# Patient Record
Sex: FEMALE | Race: WHITE | HISPANIC OR LATINO | Employment: UNEMPLOYED | URBAN - METROPOLITAN AREA
[De-identification: names, ages, dates, MRNs, and addresses within clinical notes are randomized per-mention and may not be internally consistent; named-entity substitution may affect disease eponyms.]

---
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
## 2022-01-26 ENCOUNTER — HOSPITAL ENCOUNTER (EMERGENCY)
Facility: HOSPITAL | Age: 22
Discharge: HOME | End: 2022-01-27
Attending: EMERGENCY MEDICINE

## 2022-01-26 VITALS
DIASTOLIC BLOOD PRESSURE: 85 MMHG | RESPIRATION RATE: 16 BRPM | SYSTOLIC BLOOD PRESSURE: 168 MMHG | HEART RATE: 74 BPM | TEMPERATURE: 97.5 F | OXYGEN SATURATION: 98 %

## 2022-01-26 DIAGNOSIS — F41.9 ANXIETY: Primary | ICD-10-CM

## 2022-01-26 PROCEDURE — 99282 EMERGENCY DEPT VISIT SF MDM: CPT | Performed by: EMERGENCY MEDICINE

## 2022-01-26 PROCEDURE — 99282 EMERGENCY DEPT VISIT SF MDM: CPT

## 2022-01-26 PROCEDURE — 96372 THER/PROPH/DIAG INJ SC/IM: CPT

## 2022-01-26 RX ORDER — OLANZAPINE 10 MG/1
5 INJECTION, POWDER, LYOPHILIZED, FOR SOLUTION INTRAMUSCULAR ONCE
Status: COMPLETED | OUTPATIENT
Start: 2022-01-26 | End: 2022-01-26

## 2022-01-26 RX ADMIN — OLANZAPINE 5 MG: 10 INJECTION, POWDER, FOR SOLUTION INTRAMUSCULAR at 22:58

## 2022-01-26 RX ADMIN — WATER 2.1 ML: 1 INJECTION INTRAMUSCULAR; INTRAVENOUS; SUBCUTANEOUS at 23:00

## 2022-01-27 RX ORDER — ALPRAZOLAM 0.25 MG/1
0.5 TABLET ORAL ONCE
Status: DISCONTINUED | OUTPATIENT
Start: 2022-01-27 | End: 2022-01-27 | Stop reason: HOSPADM

## 2022-01-28 NOTE — ED PROVIDER NOTES
History  Chief Complaint   Patient presents with    Panic Attack     Patient reports that she is feeling like she is having a panic attack  States she has been having periods SOB and uncontrollable crying  States she also has HA , rates pain 10/10  Denies headaches like this in the past     HPI     Patient is a pleasant 25 yof who presents with anxiety  + history of panic attacks  Absolutely no other complaints  No f/c/s/n/v/d  No cp or sob  No abdominal pain  No dysuria, hematuria  No pleuritic chest pain  MDM pleasant 25 yof, feeling completely better after treatment, will dc  Prior to Admission Medications   Prescriptions Last Dose Informant Patient Reported? Taking? Junel FE 1/20 1-20 MG-MCG per tablet   No No   Sig: TAKE 1 TABLET BY MOUTH EVERY DAY   cholecalciferol (VITAMIN D3) 400 units tablet   Yes No   Sig: Take 400 Units by mouth daily      Facility-Administered Medications: None       Past Medical History:   Diagnosis Date    Fatty liver     Fatty liver        Past Surgical History:   Procedure Laterality Date    GASTRIC RESTRICTION SURGERY      gastric sleeve       Family History   Problem Relation Age of Onset    Thyroid cancer Mother     Diabetes Mother     Ovarian cancer Paternal Aunt     Scoliosis Sister     Other Sister         born with one kidney    Club foot Sister     Heart murmur Sister     Colon cancer Maternal Grandmother     Heart disease Maternal Grandmother     Hypertension Maternal Grandmother     Diabetes Maternal Grandmother     Other Maternal Grandfather         liver failure     I have reviewed and agree with the history as documented      E-Cigarette/Vaping    E-Cigarette Use Current Every Day User      E-Cigarette/Vaping Substances    Nicotine Yes     THC Yes     CBD No     Flavoring Yes     Other No      Social History     Tobacco Use    Smoking status: Light Tobacco Smoker    Smokeless tobacco: Never Used   Vaping Use    Vaping Use: Every day    Substances: Nicotine, THC, Flavoring   Substance Use Topics    Alcohol use: Never    Drug use: Yes     Types: Marijuana       Review of Systems   Psychiatric/Behavioral: The patient is nervous/anxious  All other systems reviewed and are negative  Physical Exam  Physical Exam  Vitals and nursing note reviewed  Constitutional:       Appearance: She is well-developed  HENT:      Head: Normocephalic and atraumatic  Right Ear: External ear normal       Left Ear: External ear normal    Eyes:      Conjunctiva/sclera: Conjunctivae normal    Neck:      Vascular: No JVD  Trachea: No tracheal deviation  Cardiovascular:      Rate and Rhythm: Normal rate and regular rhythm  Heart sounds: Normal heart sounds  Pulmonary:      Effort: Pulmonary effort is normal  No respiratory distress  Breath sounds: No wheezing or rales  Abdominal:      Palpations: Abdomen is soft  Tenderness: There is no abdominal tenderness  There is no guarding or rebound  Musculoskeletal:         General: No tenderness  Cervical back: Normal range of motion and neck supple  Skin:     General: Skin is warm and dry  Findings: No erythema or rash  Neurological:      General: No focal deficit present  Mental Status: She is alert and oriented to person, place, and time  Motor: No weakness  Psychiatric:         Behavior: Behavior normal          Thought Content:  Thought content normal          Vital Signs  ED Triage Vitals [01/26/22 2240]   Temperature Pulse Respirations Blood Pressure SpO2   97 5 °F (36 4 °C) 74 16 168/85 98 %      Temp Source Heart Rate Source Patient Position - Orthostatic VS BP Location FiO2 (%)   Oral Monitor -- -- --      Pain Score       --           Vitals:    01/26/22 2240   BP: 168/85   Pulse: 74         Visual Acuity      ED Medications  Medications   OLANZapine (ZyPREXA) IM injection 5 mg (5 mg Intramuscular Given 1/26/22 4938)   sterile water injection **ADS Override Pull** (2 1 mL  Given 1/26/22 2300)       Diagnostic Studies  Results Reviewed     None                 No orders to display              Procedures  Procedures         ED Course                                             MDM    Disposition  Final diagnoses:   Anxiety     Time reflects when diagnosis was documented in both MDM as applicable and the Disposition within this note     Time User Action Codes Description Comment    1/26/2022 11:29 PM Cali Prince, [address] [F41 9] Anxiety       ED Disposition     ED Disposition Condition Date/Time Comment    Discharge Stable Wed Jan 26, 2022 11:29 PM Evert Amaya discharge to home/self care  Follow-up Information     Follow up With Specialties Details Why Contact Info    Lucas Norman DO Family Medicine In 1 day  [address] [place] [address],[address]  [address] [facility], [address]  [phone]            Discharge Medication List as of 1/26/2022 11:29 PM      CONTINUE these medications which have NOT CHANGED    Details   cholecalciferol (VITAMIN D3) 400 units tablet Take 400 Units by mouth daily, Historical Med      Junel FE 1/20 1-20 MG-MCG per tablet TAKE 1 TABLET BY MOUTH EVERY DAY, Normal             No discharge procedures on file      PDMP Review     None          ED Provider  Electronically Signed by           Davon Jones MD  01/28/22 5364

## 2022-03-27 ENCOUNTER — APPOINTMENT (EMERGENCY)
Dept: CT IMAGING | Facility: HOSPITAL | Age: 22
End: 2022-03-27

## 2022-03-27 ENCOUNTER — APPOINTMENT (EMERGENCY)
Dept: RADIOLOGY | Facility: HOSPITAL | Age: 22
End: 2022-03-27

## 2022-03-27 ENCOUNTER — HOSPITAL ENCOUNTER (EMERGENCY)
Facility: HOSPITAL | Age: 22
Discharge: HOME | End: 2022-03-27
Attending: EMERGENCY MEDICINE | Admitting: EMERGENCY MEDICINE

## 2022-03-27 VITALS
HEART RATE: 60 BPM | TEMPERATURE: 98 F | OXYGEN SATURATION: 98 % | DIASTOLIC BLOOD PRESSURE: 51 MMHG | RESPIRATION RATE: 16 BRPM | SYSTOLIC BLOOD PRESSURE: 110 MMHG

## 2022-03-27 DIAGNOSIS — F41.9 ANXIETY: Primary | ICD-10-CM

## 2022-03-27 LAB
ALBUMIN SERPL BCP-MCNC: 4 G/DL (ref 3.5–5)
ALP SERPL-CCNC: 77 U/L (ref 34–104)
ALT SERPL W P-5'-P-CCNC: 76 U/L (ref 7–52)
ANION GAP SERPL CALCULATED.3IONS-SCNC: 10 MMOL/L (ref 4–13)
AST SERPL W P-5'-P-CCNC: 32 U/L (ref 13–39)
ATRIAL RATE: 64 BPM
BASOPHILS # BLD AUTO: 0.1 THOUSANDS/ΜL (ref 0–0.1)
BASOPHILS NFR BLD AUTO: 1 % (ref 0–1)
BILIRUB SERPL-MCNC: 0.46 MG/DL (ref 0.2–1)
BUN SERPL-MCNC: 11 MG/DL (ref 5–25)
CALCIUM SERPL-MCNC: 9.4 MG/DL (ref 8.4–10.2)
CARDIAC TROPONIN I PNL SERPL HS: 4 NG/L
CHLORIDE SERPL-SCNC: 103 MMOL/L (ref 96–108)
CO2 SERPL-SCNC: 24 MMOL/L (ref 21–32)
CREAT SERPL-MCNC: 0.5 MG/DL (ref 0.6–1.3)
EOSINOPHIL # BLD AUTO: 0.25 THOUSAND/ΜL (ref 0–0.61)
EOSINOPHIL NFR BLD AUTO: 2 % (ref 0–6)
ERYTHROCYTE [DISTWIDTH] IN BLOOD BY AUTOMATED COUNT: 12.7 % (ref 11.6–15.1)
ETHANOL SERPL-MCNC: <10 MG/DL
GFR SERPL CREATININE-BSD FRML MDRD: 140 ML/MIN/1.73SQ M
GLUCOSE SERPL-MCNC: 106 MG/DL (ref 65–140)
HCG SERPL QL: NEGATIVE
HCT VFR BLD AUTO: 40.1 % (ref 34.8–46.1)
HGB BLD-MCNC: 13.3 G/DL (ref 11.5–15.4)
IMM GRANULOCYTES # BLD AUTO: 0.03 THOUSAND/UL (ref 0–0.2)
IMM GRANULOCYTES NFR BLD AUTO: 0 % (ref 0–2)
LYMPHOCYTES # BLD AUTO: 3.31 THOUSANDS/ΜL (ref 0.6–4.47)
LYMPHOCYTES NFR BLD AUTO: 29 % (ref 14–44)
MCH RBC QN AUTO: 28.5 PG (ref 26.8–34.3)
MCHC RBC AUTO-ENTMCNC: 33.2 G/DL (ref 31.4–37.4)
MCV RBC AUTO: 86 FL (ref 82–98)
MONOCYTES # BLD AUTO: 0.84 THOUSAND/ΜL (ref 0.17–1.22)
MONOCYTES NFR BLD AUTO: 7 % (ref 4–12)
NEUTROPHILS # BLD AUTO: 6.92 THOUSANDS/ΜL (ref 1.85–7.62)
NEUTS SEG NFR BLD AUTO: 61 % (ref 43–75)
NRBC BLD AUTO-RTO: 0 /100 WBCS
P AXIS: 10 DEGREES
PLATELET # BLD AUTO: 305 THOUSANDS/UL (ref 149–390)
PMV BLD AUTO: 9.5 FL (ref 8.9–12.7)
POTASSIUM SERPL-SCNC: 3.8 MMOL/L (ref 3.5–5.3)
PR INTERVAL: 162 MS
PROT SERPL-MCNC: 7.4 G/DL (ref 6.4–8.4)
QRS AXIS: 23 DEGREES
QRSD INTERVAL: 92 MS
QT INTERVAL: 420 MS
QTC INTERVAL: 437 MS
RBC # BLD AUTO: 4.66 MILLION/UL (ref 3.81–5.12)
SODIUM SERPL-SCNC: 137 MMOL/L (ref 135–147)
T WAVE AXIS: 27 DEGREES
VENTRICULAR RATE: 65 BPM
WBC # BLD AUTO: 11.45 THOUSAND/UL (ref 4.31–10.16)

## 2022-03-27 PROCEDURE — 84703 CHORIONIC GONADOTROPIN ASSAY: CPT | Performed by: STUDENT IN AN ORGANIZED HEALTH CARE EDUCATION/TRAINING PROGRAM

## 2022-03-27 PROCEDURE — 82077 ASSAY SPEC XCP UR&BREATH IA: CPT | Performed by: EMERGENCY MEDICINE

## 2022-03-27 PROCEDURE — 93005 ELECTROCARDIOGRAM TRACING: CPT

## 2022-03-27 PROCEDURE — 84484 ASSAY OF TROPONIN QUANT: CPT | Performed by: STUDENT IN AN ORGANIZED HEALTH CARE EDUCATION/TRAINING PROGRAM

## 2022-03-27 PROCEDURE — 85025 COMPLETE CBC W/AUTO DIFF WBC: CPT | Performed by: STUDENT IN AN ORGANIZED HEALTH CARE EDUCATION/TRAINING PROGRAM

## 2022-03-27 PROCEDURE — 99284 EMERGENCY DEPT VISIT MOD MDM: CPT

## 2022-03-27 PROCEDURE — 96372 THER/PROPH/DIAG INJ SC/IM: CPT

## 2022-03-27 PROCEDURE — 36415 COLL VENOUS BLD VENIPUNCTURE: CPT | Performed by: STUDENT IN AN ORGANIZED HEALTH CARE EDUCATION/TRAINING PROGRAM

## 2022-03-27 PROCEDURE — 93010 ELECTROCARDIOGRAM REPORT: CPT | Performed by: INTERNAL MEDICINE

## 2022-03-27 PROCEDURE — 99284 EMERGENCY DEPT VISIT MOD MDM: CPT | Performed by: STUDENT IN AN ORGANIZED HEALTH CARE EDUCATION/TRAINING PROGRAM

## 2022-03-27 PROCEDURE — 80053 COMPREHEN METABOLIC PANEL: CPT | Performed by: STUDENT IN AN ORGANIZED HEALTH CARE EDUCATION/TRAINING PROGRAM

## 2022-03-27 PROCEDURE — 71045 X-RAY EXAM CHEST 1 VIEW: CPT

## 2022-03-27 RX ORDER — OLANZAPINE 10 MG/1
5 INJECTION, POWDER, LYOPHILIZED, FOR SOLUTION INTRAMUSCULAR ONCE
Status: COMPLETED | OUTPATIENT
Start: 2022-03-27 | End: 2022-03-27

## 2022-03-27 RX ORDER — ACETAMINOPHEN 325 MG/1
650 TABLET ORAL ONCE
Status: COMPLETED | OUTPATIENT
Start: 2022-03-27 | End: 2022-03-27

## 2022-03-27 RX ADMIN — WATER 10 ML: 1 INJECTION INTRAMUSCULAR; INTRAVENOUS; SUBCUTANEOUS at 06:46

## 2022-03-27 RX ADMIN — ACETAMINOPHEN 650 MG: 325 TABLET ORAL at 07:33

## 2022-03-27 RX ADMIN — OLANZAPINE 5 MG: 10 INJECTION, POWDER, LYOPHILIZED, FOR SOLUTION INTRAMUSCULAR at 06:46

## 2022-03-27 NOTE — ED CARE HANDOFF
Emergency Department Sign Out Note        Sign out and transfer of care from dr Gerson Reyes    See Separate Emergency Department note  The patient, Marisol Hess, was evaluated by the previous provider for anxiety headache  prob undx pscy; Hx pseudo stroke   Gastric     Anxiety 30mix   Got zypreexaa before  Sudden onset ha;   Ct   Cp sb pravink last  Perc 0; no     Workup Completed:      ED Course / Workup Pending (followup):  anierty and etoh  Blood ekg;     ekg sr no st elevation;    Patient notes headache was earlier  worse than it is now; stabbing pain left eye , prior episode less painful, more stabbing to wards more the midline on the left; Had some etoh, will check; Agrees to wait for behavioral health eval;   here with her      etoh  Response headache  Behavioral health     Review of PCP note January notes set up for psych, mild headaches on going eval, anxiety;     PERC Rule for PE      Most Recent Value   PERC Rule for PE    Age >=50 --   HR >=100 0 Filed at: 03/27/2022 0647   O2 Sat on room air < 95% 0 Filed at: 03/27/2022 0647   History of PE or DVT 0 Filed at: 03/27/2022 6358   Recent trauma or surgery 0 Filed at: 03/27/2022 0647   Hemoptysis 0 Filed at: 03/27/2022 0083   Exogenous estrogen 0 Filed at: 03/27/2022 0647   Unilateral leg swelling 0 Filed at: 03/27/2022 8749   PERC Rule for PE Results 0 Filed at: 03/27/2022 7695                       Procedures  MDM    Pt declined ct, no insurance; Discussed headache with patient, not as severe;   At time of dc, after sleep and tylenol, headache is completely gone, feels much better;  concurs    Disposition  Final diagnoses:   Anxiety     Time reflects when diagnosis was documented in both MDM as applicable and the Disposition within this note     Time User Action Codes Description Comment    3/27/2022  8:28 AM Johny Mckeon Add [F41 9] Anxiety       ED Disposition     ED Disposition Condition Date/Time Comment    Discharge Stable Sun Mar 27, 2022 10:15 AM Ame Dumont discharge to home/self care  Follow-up Information    None       Discharge Medication List as of 3/27/2022 10:17 AM      CONTINUE these medications which have NOT CHANGED    Details   cholecalciferol (VITAMIN D3) 400 units tablet Take 400 Units by mouth daily, Historical Med      Junel FE 1/20 1-20 MG-MCG per tablet TAKE 1 TABLET BY MOUTH EVERY DAY, Normal           No discharge procedures on file         ED Provider  Electronically Signed by     Christine Hinton MD  03/27/22 860 [address], [place]  03/27/22 4712

## 2022-03-27 NOTE — Clinical Note
Dilan Mix was seen and treated in our emergency department on 3/27/2022  Diagnosis:     Ami    She may return on this date: Off work as needed for symptoms march 26,27 and 28 thru 30th as needed; If you have any questions or concerns, please don't hesitate to call        George Arnold MD    ______________________________           _______________          _______________  [facility] Representative                              Date                                Time

## 2022-03-27 NOTE — ED ATTENDING ATTESTATION
3/27/2022  IJozef MD, saw and evaluated the patient  I have discussed the patient with the resident/non-physician practitioner and agree with the resident's/non-physician practitioner's findings, Plan of Care, and MDM as documented in the resident's/non-physician practitioner's note, except where noted  All available labs and Radiology studies were reviewed  I was present for key portions of any procedure(s) performed by the resident/non-physician practitioner and I was immediately available to provide assistance  At this point I agree with the current assessment done in the Emergency Department    I have conducted an independent evaluation of this patient a history and physical is as follows:    ED Course         Critical Care Time  Procedures

## 2022-03-27 NOTE — Clinical Note
accompanied Tatianna Hartman to the emergency department on 3/27/2022  Return date if applicable:     Please excuse patient  here with her today and march 28 as needed; If you have any questions or concerns, please don't hesitate to call        Deanna Newman MD

## 2022-03-27 NOTE — ED NOTES
Pt reports "feeling better"  Wants to be discharged    Provider aware     Temi Dougherty, RN  03/27/22 9401

## 2022-03-27 NOTE — ED PROVIDER NOTES
History  Chief Complaint   Patient presents with    Panic Attack     Patient presents with anxiety that reports was not precipated by any specific event     PMHx: fatty liver, anxiety, migraines  PSH: gastric sleeve    Jasmyne Callaway is a 25year old female who presents to the ED with anxiety, substernal non-radiating chest pain, SOB, sudden onset severe HA, and blurry vision that began 30 minutes PTA, denies alleviating/aggravating factors, denies taking any medication PTA for symptom control  Patient notes h/o migraines, states HA is worse than usual migraines  Patient denies SI/HI/AH/VH and drug use, patient states was drinking alcohol 3-4 hours PTA  Per chart review, patient recently treated for anxiety with Zyprexa with significant improvement, patient states would like to try this today  Denies exogenous estrogen use (states is no longer taking Junel), h/o DVT/PE, recent surgery/immobilization, recent travel, LE edema/erythema/pain, hemoptysis, h/o CA, pleuritic nature of CP  Denies falls, head strike, neck pain, n/v  Denies any other associated sxs including lightheadedness, syncope, fever/chills, cough, sputum production, palpitations, arm pain, diaphoresis, orthopnea, abdominal pain, diarrhea, urinary symptoms, or any other concerns at this time  History provided by:  Patient and medical records   used: No        Prior to Admission Medications   Prescriptions Last Dose Informant Patient Reported? Taking?    Junel FE 1/20 1-20 MG-MCG per tablet   No No   Sig: TAKE 1 TABLET BY MOUTH EVERY DAY   cholecalciferol (VITAMIN D3) 400 units tablet   Yes No   Sig: Take 400 Units by mouth daily      Facility-Administered Medications: None       Past Medical History:   Diagnosis Date    Fatty liver     Fatty liver        Past Surgical History:   Procedure Laterality Date    GASTRIC RESTRICTION SURGERY      gastric sleeve       Family History   Problem Relation Age of Onset    Thyroid cancer Mother     Diabetes Mother     Ovarian cancer Paternal Aunt     Scoliosis Sister     Other Sister         born with one kidney    Club foot Sister     Heart murmur Sister     Colon cancer Maternal Grandmother     Heart disease Maternal Grandmother     Hypertension Maternal Grandmother     Diabetes Maternal Grandmother     Other Maternal Grandfather         liver failure     I have reviewed and agree with the history as documented  E-Cigarette/Vaping    E-Cigarette Use Current Every Day User      E-Cigarette/Vaping Substances    Nicotine Yes     THC Yes     CBD No     Flavoring Yes     Other No      Social History     Tobacco Use    Smoking status: Former Smoker    Smokeless tobacco: Never Used   Vaping Use    Vaping Use: Every day    Substances: Nicotine, THC, Flavoring   Substance Use Topics    Alcohol use: Yes     Comment: socially    Drug use: Yes     Types: Marijuana       Review of Systems   Constitutional: Negative for chills, diaphoresis and fever  HENT: Negative for ear pain, sore throat, trouble swallowing and voice change  Eyes: Positive for visual disturbance  Negative for pain and redness  Respiratory: Positive for shortness of breath  Negative for cough  Cardiovascular: Positive for chest pain  Negative for palpitations and leg swelling  Gastrointestinal: Negative for abdominal pain, diarrhea, nausea and vomiting  Genitourinary: Negative for dysuria and frequency  Musculoskeletal: Negative for arthralgias, back pain, gait problem and neck pain  Skin: Negative for color change and rash  Neurological: Positive for headaches  Negative for syncope, weakness, light-headedness and numbness  Psychiatric/Behavioral: Negative for hallucinations and suicidal ideas  The patient is nervous/anxious  Physical Exam  Physical Exam  Vitals and nursing note reviewed  Constitutional:       General: She is in acute distress (2/2 anxiety)        Appearance: Normal appearance  She is well-developed  She is obese  She is not ill-appearing  HENT:      Head: Normocephalic and atraumatic  Right Ear: External ear normal       Left Ear: External ear normal       Nose: Nose normal  No congestion or rhinorrhea  Mouth/Throat:      Mouth: Mucous membranes are moist    Eyes:      General:         Right eye: No discharge  Left eye: No discharge  Extraocular Movements: Extraocular movements intact  Conjunctiva/sclera: Conjunctivae normal       Pupils: Pupils are equal, round, and reactive to light  Cardiovascular:      Rate and Rhythm: Normal rate and regular rhythm  Heart sounds: No murmur heard  No friction rub  No gallop  Pulmonary:      Effort: Pulmonary effort is normal  No respiratory distress  Breath sounds: Normal breath sounds  No stridor  No wheezing, rhonchi or rales  Chest:      Chest wall: No tenderness  Abdominal:      General: Bowel sounds are normal  There is no distension  Palpations: Abdomen is soft  Tenderness: There is no abdominal tenderness  There is no right CVA tenderness, left CVA tenderness, guarding or rebound  Genitourinary:     Comments: Deferred  Musculoskeletal:         General: No swelling, deformity or signs of injury  Cervical back: Normal range of motion and neck supple  No tenderness  Right lower leg: No edema  Left lower leg: No edema  Comments: No LE edema, erythema, or TTP b/l   Skin:     General: Skin is warm and dry  Capillary Refill: Capillary refill takes less than 2 seconds  Findings: No bruising, erythema or rash  Neurological:      General: No focal deficit present  Mental Status: She is alert and oriented to person, place, and time  Cranial Nerves: No cranial nerve deficit (CN II-XII intact)  Sensory: No sensory deficit  Motor: No weakness (5/5 strength to b/l UE & LE)        Coordination: Coordination normal       Gait: Gait normal    Psychiatric:         Mood and Affect: Mood is anxious  Speech: Speech normal          Behavior: Behavior is not agitated or aggressive  Behavior is cooperative  Thought Content: Thought content normal  Thought content does not include homicidal or suicidal ideation  Vital Signs  ED Triage Vitals [03/27/22 0639]   Temperature Pulse Respirations Blood Pressure SpO2   98 °F (36 7 °C) 71 20 137/95 99 %      Temp Source Heart Rate Source Patient Position - Orthostatic VS BP Location FiO2 (%)   Oral Monitor Lying Left arm --      Pain Score       No Pain           Vitals:    03/27/22 0639   BP: 137/95   Pulse: 71   Patient Position - Orthostatic VS: Lying         Visual Acuity      ED Medications  Medications   OLANZapine (ZyPREXA) IM injection 5 mg (5 mg Intramuscular Given 3/27/22 0646)   sterile water injection **ADS Override Pull** (10 mL  Given 3/27/22 0646)       Diagnostic Studies  Results Reviewed     Procedure Component Value Units Date/Time    HS Troponin 0hr (reflex protocol) [[id number]] Collected: 03/27/22 0659    Lab Status: In process Specimen: Blood from Arm, Right Updated: 03/27/22 0704    Comprehensive metabolic panel [[id number]] Collected: 03/27/22 0659    Lab Status: In process Specimen: Blood from Arm, Right Updated: 03/27/22 0704    hCG, qualitative pregnancy [[id number]] Collected: 03/27/22 0659    Lab Status: In process Specimen: Blood from Arm, Right Updated: 03/27/22 0704    CBC and differential [[id number]] Collected: 03/27/22 0659    Lab Status:  In process Specimen: Blood from Arm, Right Updated: 03/27/22 0704                 XR chest 1 view portable    (Results Pending)   CT head without contrast    (Results Pending)              Procedures  Procedures         ED Course  ED Course as of 03/27/22 0705   Claudia Smith Mar 27, 2022   0658 Patient declines head CT 2/2 lack of insurance, patient informed of risks, verbalized understanding and acceptance, patient would like to think about obtained CT     0703 Patient states is no longer taking Junecherri AARON      Most Recent Value   SBIRT (13-21 yo)    In order to provide better care to our patients, we are screening all of our patients for alcohol and drug use  Would it be okay to ask you these screening questions? Yes Filed at: 03/27/2022 0647   GALEN Initial Screen: During the past 12 months, did you:    1  Drink any alcohol (more than a few sips)? No Filed at: 03/27/2022 0647   2  Smoke any marijuana or hashish Yes Filed at: 03/27/2022 0647   3  Use anything else to get high? ("anything else" includes illegal drugs, over the counter and prescription drugs, and things that you sniff or 'anderson')? No Filed at: 03/27/2022 0647                    PERC Rule for PE      Most Recent Value   PERC Rule for PE    Age >=50 --   HR >=100 0 Filed at: 03/27/2022 0647   O2 Sat on room air < 95% 0 Filed at: 03/27/2022 8189   History of PE or DVT 0 Filed at: 03/27/2022 7379   Recent trauma or surgery 0 Filed at: 03/27/2022 8750   Hemoptysis 0 Filed at: 03/27/2022 2354   Exogenous estrogen 0 Filed at: 03/27/2022 9219   Unilateral leg swelling 0 Filed at: 03/27/2022 6192   PERC Rule for PE Results 0 Filed at: 03/27/2022 7006                            MDM  Number of Diagnoses or Management Options  Anxiety  Diagnosis management comments: Patient is a 25year old female who presents to the ED with anxiety, substernal non-radiating chest pain, SOB, sudden onset severe HA, and blurry vision x 30 minutes, h/o migraines, states HA is worse than usual migraines, denies SI/HI/AH/VH and drug use, denies any other associated sxs  Patient appears anxious, exam wnl  Recently treated for anxiety with Zyprexa with significant improvement, patient states would like this today  PERC 0, PE unlikely  Head CT ordered; patient declines 2/2 insurance issues   Patient informed of risks as given HA would like to r/o ICH, verbalized understanding, would like time to consider  Patient signed out to Dr Jessica Peguero pending, labs, CXR, re-eval, and dispo  Amount and/or Complexity of Data Reviewed  Clinical lab tests: ordered  Tests in the radiology section of CPT®: ordered        Disposition  Final diagnoses:   None     ED Disposition     None      Follow-up Information    None         Patient's Medications   Discharge Prescriptions    No medications on file       No discharge procedures on file      PDMP Review     None          ED Provider  Electronically Signed by           Gael Cuellar PA-C  03/28/22 9559

## 2022-03-27 NOTE — DISCHARGE INSTRUCTIONS
Call your primary care doctor office, dr Huy Ponce, Monday and set up followup appointment; Followup with psychiatry and counseling appointments; Take the medications prescribed to you by your doctor;      If questions or problems prior to seeing doctor hannah, call the office number and speak to the on call doctor, even if it is evening or night or weekend or holiday, there is always someone available to talk with you by phone;    Calling behavioral health crisis number is also an option for you if needed;[phone]

## 2022-07-27 ENCOUNTER — HOSPITAL ENCOUNTER (EMERGENCY)
Facility: HOSPITAL | Age: 22
Discharge: HOME | End: 2022-07-28
Attending: EMERGENCY MEDICINE
Payer: COMMERCIAL

## 2022-07-27 ENCOUNTER — APPOINTMENT (EMERGENCY)
Dept: RADIOLOGY | Facility: HOSPITAL | Age: 22
End: 2022-07-27
Payer: COMMERCIAL

## 2022-07-27 VITALS
BODY MASS INDEX: 53.14 KG/M2 | DIASTOLIC BLOOD PRESSURE: 59 MMHG | OXYGEN SATURATION: 98 % | HEART RATE: 66 BPM | RESPIRATION RATE: 16 BRPM | WEIGHT: 293 LBS | SYSTOLIC BLOOD PRESSURE: 126 MMHG

## 2022-07-27 DIAGNOSIS — R55 SYNCOPE: ICD-10-CM
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
## 2022-07-27 DIAGNOSIS — M62.838 MUSCLE SPASM: ICD-10-CM

## 2022-07-27 LAB — HCG SERPL QL: NEGATIVE

## 2022-07-27 PROCEDURE — 99285 EMERGENCY DEPT VISIT HI MDM: CPT | Performed by: EMERGENCY MEDICINE

## 2022-07-27 PROCEDURE — 93005 ELECTROCARDIOGRAM TRACING: CPT

## 2022-07-27 PROCEDURE — G1004 CDSM NDSC: HCPCS

## 2022-07-27 PROCEDURE — 74176 CT ABD & PELVIS W/O CONTRAST: CPT

## 2022-07-27 PROCEDURE — 99284 EMERGENCY DEPT VISIT MOD MDM: CPT

## 2022-07-27 PROCEDURE — 71250 CT THORAX DX C-: CPT

## 2022-07-27 PROCEDURE — 36415 COLL VENOUS BLD VENIPUNCTURE: CPT

## 2022-07-27 PROCEDURE — 84703 CHORIONIC GONADOTROPIN ASSAY: CPT

## 2022-07-27 RX ORDER — ACETAMINOPHEN 325 MG/1
650 TABLET ORAL ONCE
Status: COMPLETED | OUTPATIENT
Start: 2022-07-27 | End: 2022-07-27

## 2022-07-27 RX ADMIN — ACETAMINOPHEN 650 MG: 325 TABLET ORAL at 22:12

## 2022-07-27 NOTE — Clinical Note
Homa Yobani was seen and treated in our emergency department on 7/27/2022  Diagnosis:     Ami    She may return on this date: 07/30/2022         If you have any questions or concerns, please don't hesitate to call        Freddy Kaplan DO    ______________________________           _______________          _______________  Hospital Representative                              Date                                Time

## 2022-07-28 LAB
ATRIAL RATE: 63 BPM
P AXIS: -14 DEGREES
PR INTERVAL: 160 MS
QRS AXIS: 49 DEGREES
QRSD INTERVAL: 90 MS
QT INTERVAL: 396 MS
QTC INTERVAL: 405 MS
T WAVE AXIS: 27 DEGREES
VENTRICULAR RATE: 63 BPM

## 2022-07-28 PROCEDURE — 93010 ELECTROCARDIOGRAM REPORT: CPT | Performed by: INTERNAL MEDICINE

## 2022-07-28 RX ORDER — CYCLOBENZAPRINE HCL 10 MG
10 TABLET ORAL 2 TIMES DAILY PRN
Qty: 20 TABLET | Refills: 0 | Status: SHIPPED | OUTPATIENT
Start: 2022-07-28

## 2022-07-28 NOTE — ED NOTES
Pt states she's in too much pain to walk  RN adjusted pt with some relief  Medicated per orders        Tomasa Garcia RN  07/27/22 3629

## 2022-07-28 NOTE — ED NOTES
Patient transported to [facility], [address] [facility]  07/27/22 9202
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
## 2023-12-08 ENCOUNTER — HOSPITAL ENCOUNTER (EMERGENCY)
Facility: HOSPITAL | Age: 23
Discharge: HOME | End: 2023-12-08
Attending: EMERGENCY MEDICINE

## 2023-12-08 VITALS
HEART RATE: 83 BPM | WEIGHT: 293 LBS | TEMPERATURE: 98.7 F | OXYGEN SATURATION: 100 % | SYSTOLIC BLOOD PRESSURE: 165 MMHG | RESPIRATION RATE: 18 BRPM | DIASTOLIC BLOOD PRESSURE: 110 MMHG | BODY MASS INDEX: 65.54 KG/M2

## 2023-12-08 DIAGNOSIS — H72.2X1 TYMPANIC MEMBRANE PERFORATION, MARGINAL, RIGHT: ICD-10-CM

## 2023-12-08 DIAGNOSIS — J06.9 UPPER RESPIRATORY INFECTION: Primary | ICD-10-CM

## 2023-12-08 PROCEDURE — 99283 EMERGENCY DEPT VISIT LOW MDM: CPT

## 2023-12-08 PROCEDURE — 99284 EMERGENCY DEPT VISIT MOD MDM: CPT | Performed by: EMERGENCY MEDICINE

## 2023-12-08 RX ORDER — CIPROFLOXACIN AND DEXAMETHASONE 3; 1 MG/ML; MG/ML
4 SUSPENSION/ DROPS AURICULAR (OTIC) 2 TIMES DAILY
Status: DISCONTINUED | OUTPATIENT
Start: 2023-12-08 | End: 2023-12-08 | Stop reason: HOSPADM

## 2023-12-08 RX ORDER — ACETAMINOPHEN 325 MG/1
650 TABLET ORAL ONCE
Status: COMPLETED | OUTPATIENT
Start: 2023-12-08 | End: 2023-12-08

## 2023-12-08 RX ORDER — OXYMETAZOLINE HYDROCHLORIDE 0.05 G/100ML
2 SPRAY NASAL ONCE
Status: COMPLETED | OUTPATIENT
Start: 2023-12-08 | End: 2023-12-08

## 2023-12-08 RX ORDER — CIPROFLOXACIN 500 MG/1
500 TABLET, FILM COATED ORAL 2 TIMES DAILY
Qty: 10 TABLET | Refills: 0 | Status: SHIPPED | OUTPATIENT
Start: 2023-12-08 | End: 2023-12-13

## 2023-12-08 RX ORDER — CIPROFLOXACIN AND DEXAMETHASONE 3; 1 MG/ML; MG/ML
4 SUSPENSION/ DROPS AURICULAR (OTIC) 2 TIMES DAILY
Qty: 7.5 ML | Refills: 0 | Status: SHIPPED | OUTPATIENT
Start: 2023-12-08

## 2023-12-08 RX ORDER — CIPROFLOXACIN 500 MG/1
500 TABLET, FILM COATED ORAL ONCE
Status: COMPLETED | OUTPATIENT
Start: 2023-12-08 | End: 2023-12-08

## 2023-12-08 RX ADMIN — ACETAMINOPHEN 650 MG: 325 TABLET, FILM COATED ORAL at 17:55

## 2023-12-08 RX ADMIN — IBUPROFEN 400 MG: 100 SUSPENSION ORAL at 17:55

## 2023-12-08 RX ADMIN — CIPROFLOXACIN AND DEXAMETHASONE 4 DROP: 3; 1 SUSPENSION/ DROPS AURICULAR (OTIC) at 18:35

## 2023-12-08 RX ADMIN — CIPROFLOXACIN 500 MG: 500 TABLET, FILM COATED ORAL at 17:55

## 2023-12-08 RX ADMIN — OXYMETAZOLINE HYDROCHLORIDE 2 SPRAY: 0.05 SPRAY NASAL at 17:55
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
## 2023-12-08 NOTE — Clinical Note
Alona Gunn was seen and treated in our emergency department on 12/8/2023. Diagnosis:     Brian Francis  may return to work on return date. She may return on this date: 12/11/2023         If you have any questions or concerns, please don't hesitate to call.       Rosalia Martin, DO    ______________________________           _______________          _______________  Hospital Representative                              Date                                Time

## 2024-01-23 ENCOUNTER — HOSPITAL ENCOUNTER (EMERGENCY)
Facility: HOSPITAL | Age: 24
Discharge: HOME | End: 2024-01-23
Attending: EMERGENCY MEDICINE | Admitting: EMERGENCY MEDICINE

## 2024-01-23 VITALS
DIASTOLIC BLOOD PRESSURE: 107 MMHG | RESPIRATION RATE: 16 BRPM | SYSTOLIC BLOOD PRESSURE: 186 MMHG | HEART RATE: 80 BPM | OXYGEN SATURATION: 96 % | TEMPERATURE: 97.5 F

## 2024-01-23 DIAGNOSIS — K04.7 DENTAL INFECTION: Primary | ICD-10-CM

## 2024-01-23 PROCEDURE — 99282 EMERGENCY DEPT VISIT SF MDM: CPT

## 2024-01-23 PROCEDURE — 64400 NJX AA&/STRD TRIGEMINAL NRV: CPT | Performed by: EMERGENCY MEDICINE

## 2024-01-23 PROCEDURE — 99284 EMERGENCY DEPT VISIT MOD MDM: CPT | Performed by: EMERGENCY MEDICINE

## 2024-01-23 RX ORDER — PENICILLIN V POTASSIUM 500 MG/1
500 TABLET ORAL 4 TIMES DAILY
Qty: 28 TABLET | Refills: 0 | Status: SHIPPED | OUTPATIENT
Start: 2024-01-23 | End: 2024-01-30

## 2024-01-23 RX ORDER — CHLORHEXIDINE GLUCONATE ORAL RINSE 1.2 MG/ML
15 SOLUTION DENTAL EVERY 12 HOURS SCHEDULED
Status: DISCONTINUED | OUTPATIENT
Start: 2024-01-23 | End: 2024-01-23

## 2024-01-23 RX ORDER — CHLORHEXIDINE GLUCONATE ORAL RINSE 1.2 MG/ML
15 SOLUTION DENTAL ONCE
Status: COMPLETED | OUTPATIENT
Start: 2024-01-23 | End: 2024-01-23

## 2024-01-23 RX ORDER — CHLORHEXIDINE GLUCONATE ORAL RINSE 1.2 MG/ML
15 SOLUTION DENTAL 2 TIMES DAILY
Qty: 120 ML | Refills: 0 | Status: SHIPPED | OUTPATIENT
Start: 2024-01-23

## 2024-01-23 RX ORDER — PENICILLIN V POTASSIUM 500 MG/1
500 TABLET ORAL ONCE
Status: COMPLETED | OUTPATIENT
Start: 2024-01-23 | End: 2024-01-23

## 2024-01-23 RX ORDER — DIPHENHYDRAMINE HYDROCHLORIDE AND LIDOCAINE HYDROCHLORIDE AND ALUMINUM HYDROXIDE AND MAGNESIUM HYDRO
10 KIT ONCE
Status: COMPLETED | OUTPATIENT
Start: 2024-01-23 | End: 2024-01-23

## 2024-01-23 RX ORDER — ROPIVACAINE HYDROCHLORIDE 5 MG/ML
15 INJECTION, SOLUTION EPIDURAL; INFILTRATION; PERINEURAL ONCE
Status: COMPLETED | OUTPATIENT
Start: 2024-01-23 | End: 2024-01-23

## 2024-01-23 RX ADMIN — PENICILLIN V POTASSIUM 500 MG: 500 TABLET, FILM COATED ORAL at 16:46

## 2024-01-23 RX ADMIN — DIPHENHYDRAMINE HYDROCHLORIDE AND LIDOCAINE HYDROCHLORIDE AND ALUMINUM HYDROXIDE AND MAGNESIUM HYDRO 10 ML: KIT at 16:46

## 2024-01-23 RX ADMIN — CHLORHEXIDINE GLUCONATE 15 ML: 1.2 SOLUTION ORAL at 16:46

## 2024-01-23 RX ADMIN — ROPIVACAINE HYDROCHLORIDE 15 ML: 5 INJECTION EPIDURAL; INFILTRATION; PERINEURAL at 16:46

## 2024-01-23 NOTE — DISCHARGE INSTRUCTIONS
Call and schedule an appointment with a dentist as soon as possible. Take tylenol 500 mg and ibuprofen 400 mg every 6 hours for pain. Return to ER for jaw or neck swelling.

## 2024-01-23 NOTE — ED ATTENDING ATTESTATION
1/23/2024  I, Yas Mccain MD, saw and evaluated the patient. I have discussed the patient with the resident/non-physician practitioner and agree with the resident's/non-physician practitioner's findings, Plan of Care, and MDM as documented in the resident's/non-physician practitioner's note, except where noted. All available labs and Radiology studies were reviewed.  I was present for key portions of any procedure(s) performed by the resident/non-physician practitioner and I was immediately available to provide assistance.       At this point I agree with the current assessment done in the Emergency Department.  I have conducted an independent evaluation of this patient a history and physical is as follows:  Dental pain, broken tooth, was scheduled to get it But has not.  Has pain with swallowing and pain in tooth, no fevers, chills, or drainage.  On exam patient has a broken tooth with no evidence of abscess, no trismus, floor mouth is soft, no neck masses, oropharynx is clear.  Impression: Dental pain.  Will plan to treat with antibiotics and discharged to follow-up with dentistry  ED Course         Critical Care Time  Procedures

## 2024-01-23 NOTE — ED PROVIDER NOTES
History  Chief Complaint   Patient presents with    Dental Pain     Pt c/o right lower dental pain x2 days, swelling, and painful to swallow     24 yo F w/ presents w/ dentalgia x3 days        Prior to Admission Medications   Prescriptions Last Dose Informant Patient Reported? Taking?   Junel FE 1/20 1-20 MG-MCG per tablet   No No   Sig: TAKE 1 TABLET BY MOUTH EVERY DAY   cholecalciferol (VITAMIN D3) 400 units tablet   Yes No   Sig: Take 400 Units by mouth daily   ciprofloxacin-dexamethasone (CIPRODEX) otic suspension   No No   Sig: Administer 4 drops into both ears 2 (two) times a day   cyclobenzaprine (FLEXERIL) 10 mg tablet   No No   Sig: Take 1 tablet (10 mg total) by mouth 2 (two) times a day as needed for muscle spasms      Facility-Administered Medications: None       Past Medical History:   Diagnosis Date    Fatty liver     Fatty liver        Past Surgical History:   Procedure Laterality Date    GASTRIC RESTRICTION SURGERY      gastric sleeve       Family History   Problem Relation Age of Onset    Thyroid cancer Mother     Diabetes Mother     Ovarian cancer Paternal Aunt     Scoliosis Sister     Other Sister         born with one kidney    Club foot Sister     Heart murmur Sister     Colon cancer Maternal Grandmother     Heart disease Maternal Grandmother     Hypertension Maternal Grandmother     Diabetes Maternal Grandmother     Other Maternal Grandfather         liver failure     I have reviewed and agree with the history as documented.    E-Cigarette/Vaping    E-Cigarette Use Current Every Day User      E-Cigarette/Vaping Substances    Nicotine Yes     THC Yes     CBD No     Flavoring Yes     Other No      Social History     Tobacco Use    Smoking status: Former    Smokeless tobacco: Never   Vaping Use    Vaping status: Every Day    Substances: Nicotine, THC, Flavoring   Substance Use Topics    Alcohol use: Yes     Comment: socially    Drug use: Yes     Types: Marijuana        Review of  Systems    Physical Exam  ED Triage Vitals [01/23/24 1515]   Temperature Pulse Respirations Blood Pressure SpO2   97.5 °F (36.4 °C) 80 16 (!) 186/107 96 %      Temp Source Heart Rate Source Patient Position - Orthostatic VS BP Location FiO2 (%)   Temporal Monitor -- -- --      Pain Score       10 - Worst Possible Pain             Orthostatic Vital Signs  Vitals:    01/23/24 1515   BP: (!) 186/107   Pulse: 80       Physical Exam  Vitals and nursing note reviewed.   Constitutional:       General: She is not in acute distress.     Appearance: Normal appearance. She is not ill-appearing, toxic-appearing or diaphoretic.   HENT:      Head: Normocephalic and atraumatic.      Right Ear: External ear normal.      Left Ear: External ear normal.      Nose: Nose normal.      Mouth/Throat:      Mouth: Mucous membranes are moist.      Pharynx: Oropharynx is clear. No oropharyngeal exudate or posterior oropharyngeal erythema.        Comments: Mild peridontal erythema adjacent to tooth #30. No edema or visible abscesses. No submental or cervical tenderness or edema.  Eyes:      General:         Right eye: No discharge.         Left eye: No discharge.      Conjunctiva/sclera: Conjunctivae normal.   Cardiovascular:      Rate and Rhythm: Normal rate.   Pulmonary:      Effort: Pulmonary effort is normal. No respiratory distress.   Musculoskeletal:         General: Normal range of motion.      Cervical back: Normal range of motion and neck supple. No tenderness.   Skin:     General: Skin is warm and dry.      Capillary Refill: Capillary refill takes less than 2 seconds.      Coloration: Skin is not pale.   Neurological:      Mental Status: She is alert and oriented to person, place, and time.   Psychiatric:         Mood and Affect: Mood normal.         Behavior: Behavior normal.         ED Medications  Medications   ropivacaine (NAROPIN) 0.5 % injection 15 mL (15 mL Infiltration Given by Other 1/23/24 0628)   penicillin V potassium  "(VEETID) tablet 500 mg (500 mg Oral Given 1/23/24 1646)   diphenhydramine, lidocaine, Al/Mg hydroxide, simethicone (Magic Mouthwash) oral solution 10 mL (10 mL Swish & Spit Given 1/23/24 1646)   chlorhexidine (PERIDEX) 0.12 % oral rinse 15 mL (15 mL Swish & Spit Given 1/23/24 1646)       Diagnostic Studies  Results Reviewed       None                   No orders to display         Procedures  Nerve block    Date/Time: 1/23/2024 5:46 PM    Performed by: Alvaro Larry MD  Authorized by: Alvaro Larry MD    Patient location:  ED  [place] Protocol:  Consent: Verbal consent obtained. Written consent not obtained.  Risks and benefits: risks, benefits and alternatives were discussed  Consent given by: patient  Time out: Immediately prior to procedure a \"time out\" was called to verify the correct patient, procedure, equipment, support staff and site/side marked as required.  Timeout called at: 1/23/2024 5:46 PM.  Patient understanding: patient states understanding of the procedure being performed  Required items: required blood products, implants, devices, and special equipment available  Patient identity confirmed: verbally with patient    Indications:     Indications:  Pain relief  Location:     Body area:  Head    Head nerve blocked: inferior alveolar.    Nerve type:  Peripheral    Laterality:  Right  Pre-procedure details:     Skin preparation:  2% chlorhexidine  Skin anesthesia (see MAR for exact dosages):     Skin anesthesia method:  None  Procedure details (see MAR for exact dosages):     Block needle gauge:  27 G    Anesthetic injected:  Ropivacaine 0.5%    Steroid injected:  None    Additive injected:  None    Injection procedure:  Anatomic landmarks identified, incremental injection, negative aspiration for blood, anatomic landmarks palpated and introduced needle  Post-procedure details:     Dressing:  None    Outcome:  Anesthesia achieved    Patient tolerance of procedure:  Tolerated well, no " immediate complications        ED Course                                       Medical Decision Making  23-year-old female presents with dentalgia and periodontal erythema.  No findings on history or physical exam to suggest Ryan's angina, sepsis, or soft tissue neck infection.    Plan: Antibiotics for dental infection, inferior alveolar nerve block, follow-up with dental.    Amount and/or Complexity of Data Reviewed  Independent Historian: guardian    Risk  Prescription drug management.          Disposition  Final diagnoses:   Dental infection     Time reflects when diagnosis was documented in both MDM as applicable and the Disposition within this note       Time User Action Codes Description Comment    1/23/2024  4:53 PM Alvaro Larry Add [K04.7] Dental infection           ED Disposition       ED Disposition   Discharge    Condition   Stable    Date/Time   Tue Jan 23, 2024  4:53 PM    Comment   Ami Alexander discharge to home/self care.                   Follow-up Information       Follow up With Specialties Details Why Contact Info Additional Information    [facility] Schedule an appointment as soon as possible for a visit   [facility] [address] [place] [phone]  [phone] [facility], [facility] [address] [place] [address], [phone], [phone]            Patient's Medications   Discharge Prescriptions    CHLORHEXIDINE (PERIDEX) 0.12 % SOLUTION    Apply 15 mL to the mouth or throat 2 (two) times a day       Start Date: 1/23/2024 End Date: --       Order Dose: 15 mL       Quantity: 120 mL    Refills: 0    PENICILLIN V POTASSIUM (VEETID) 500 MG TABLET    Take 1 tablet (500 mg total) by mouth 4 (four) times a day for 7 days       Start Date: 1/23/2024 End Date: 1/30/2024       Order Dose: 500 mg       Quantity: 28 tablet    Refills: 0     No discharge procedures on file.    PDMP Review       None             ED Provider  Attending  physically available and evaluated Ami Alexander. I managed the patient along with the ED Attending.    Electronically Signed by           Alvaro Larry MD  01/23/24 7384

## 2025-03-19 ENCOUNTER — HOSPITAL ENCOUNTER (EMERGENCY)
Facility: HOSPITAL | Age: 25
Discharge: HOME | End: 2025-03-19
Attending: EMERGENCY MEDICINE | Admitting: EMERGENCY MEDICINE
Payer: COMMERCIAL

## 2025-03-19 ENCOUNTER — APPOINTMENT (EMERGENCY)
Dept: ULTRASOUND IMAGING | Facility: HOSPITAL | Age: 25
End: 2025-03-19
Payer: COMMERCIAL

## 2025-03-19 VITALS
DIASTOLIC BLOOD PRESSURE: 83 MMHG | TEMPERATURE: 98.3 F | OXYGEN SATURATION: 98 % | SYSTOLIC BLOOD PRESSURE: 149 MMHG | HEART RATE: 83 BPM | RESPIRATION RATE: 16 BRPM

## 2025-03-19 DIAGNOSIS — R73.9 HYPERGLYCEMIA: ICD-10-CM

## 2025-03-19 DIAGNOSIS — N76.0 VULVOVAGINITIS: ICD-10-CM

## 2025-03-19 DIAGNOSIS — N89.8 VAGINAL DISCHARGE: Primary | ICD-10-CM

## 2025-03-19 DIAGNOSIS — R74.8 ELEVATED LIVER ENZYMES: ICD-10-CM

## 2025-03-19 LAB
ALBUMIN SERPL BCG-MCNC: 4.1 G/DL (ref 3.5–5)
ALP SERPL-CCNC: 90 U/L (ref 34–104)
ALT SERPL W P-5'-P-CCNC: 137 U/L (ref 7–52)
ANION GAP SERPL CALCULATED.3IONS-SCNC: 9 MMOL/L (ref 4–13)
AST SERPL W P-5'-P-CCNC: 92 U/L (ref 13–39)
BACTERIA UR QL AUTO: NORMAL /HPF
BASOPHILS # BLD AUTO: 0.07 THOUSANDS/ÂΜL (ref 0–0.1)
BASOPHILS NFR BLD AUTO: 1 % (ref 0–1)
BILIRUB SERPL-MCNC: 0.5 MG/DL (ref 0.2–1)
BILIRUB UR QL STRIP: NEGATIVE
BUN SERPL-MCNC: 11 MG/DL (ref 5–25)
CALCIUM SERPL-MCNC: 9.3 MG/DL (ref 8.4–10.2)
CHLORIDE SERPL-SCNC: 100 MMOL/L (ref 96–108)
CLARITY UR: CLEAR
CO2 SERPL-SCNC: 23 MMOL/L (ref 21–32)
COLOR UR: ABNORMAL
CREAT SERPL-MCNC: 0.58 MG/DL (ref 0.6–1.3)
EOSINOPHIL # BLD AUTO: 0.15 THOUSAND/ÂΜL (ref 0–0.61)
EOSINOPHIL NFR BLD AUTO: 1 % (ref 0–6)
ERYTHROCYTE [DISTWIDTH] IN BLOOD BY AUTOMATED COUNT: 12.7 % (ref 11.6–15.1)
EST. AVERAGE GLUCOSE BLD GHB EST-MCNC: 237 MG/DL
EXT PREGNANCY TEST URINE: NEGATIVE
EXT. CONTROL: NORMAL
GFR SERPL CREATININE-BSD FRML MDRD: 131 ML/MIN/1.73SQ M
GLUCOSE SERPL-MCNC: 305 MG/DL (ref 65–140)
GLUCOSE UR STRIP-MCNC: ABNORMAL MG/DL
HBA1C MFR BLD: 9.9 %
HCT VFR BLD AUTO: 44 % (ref 34.8–46.1)
HGB BLD-MCNC: 14.4 G/DL (ref 11.5–15.4)
HGB UR QL STRIP.AUTO: ABNORMAL
IMM GRANULOCYTES # BLD AUTO: 0.03 THOUSAND/UL (ref 0–0.2)
IMM GRANULOCYTES NFR BLD AUTO: 0 % (ref 0–2)
KETONES UR STRIP-MCNC: NEGATIVE MG/DL
LEUKOCYTE ESTERASE UR QL STRIP: ABNORMAL
LYMPHOCYTES # BLD AUTO: 2.94 THOUSANDS/ÂΜL (ref 0.6–4.47)
LYMPHOCYTES NFR BLD AUTO: 25 % (ref 14–44)
MCH RBC QN AUTO: 28.4 PG (ref 26.8–34.3)
MCHC RBC AUTO-ENTMCNC: 32.7 G/DL (ref 31.4–37.4)
MCV RBC AUTO: 87 FL (ref 82–98)
MONOCYTES # BLD AUTO: 0.75 THOUSAND/ÂΜL (ref 0.17–1.22)
MONOCYTES NFR BLD AUTO: 6 % (ref 4–12)
NEUTROPHILS # BLD AUTO: 7.86 THOUSANDS/ÂΜL (ref 1.85–7.62)
NEUTS SEG NFR BLD AUTO: 67 % (ref 43–75)
NITRITE UR QL STRIP: NEGATIVE
NON-SQ EPI CELLS URNS QL MICRO: NORMAL /HPF
NRBC BLD AUTO-RTO: 0 /100 WBCS
PH UR STRIP.AUTO: 5.5 [PH]
PLATELET # BLD AUTO: 247 THOUSANDS/UL (ref 149–390)
PMV BLD AUTO: 10.3 FL (ref 8.9–12.7)
POTASSIUM SERPL-SCNC: 5.5 MMOL/L (ref 3.5–5.3)
PROT SERPL-MCNC: 7.9 G/DL (ref 6.4–8.4)
PROT UR STRIP-MCNC: ABNORMAL MG/DL
RBC # BLD AUTO: 5.07 MILLION/UL (ref 3.81–5.12)
RBC #/AREA URNS AUTO: NORMAL /HPF
SODIUM SERPL-SCNC: 132 MMOL/L (ref 135–147)
SP GR UR STRIP.AUTO: 1.03 (ref 1–1.03)
UROBILINOGEN UR STRIP-ACNC: <2 MG/DL
WBC # BLD AUTO: 11.8 THOUSAND/UL (ref 4.31–10.16)
WBC #/AREA URNS AUTO: NORMAL /HPF

## 2025-03-19 PROCEDURE — 81514 NFCT DS BV&VAGINITIS DNA ALG: CPT | Performed by: PHYSICIAN ASSISTANT

## 2025-03-19 PROCEDURE — 99284 EMERGENCY DEPT VISIT MOD MDM: CPT | Performed by: PHYSICIAN ASSISTANT

## 2025-03-19 PROCEDURE — 76856 US EXAM PELVIC COMPLETE: CPT

## 2025-03-19 PROCEDURE — 85025 COMPLETE CBC W/AUTO DIFF WBC: CPT | Performed by: PHYSICIAN ASSISTANT

## 2025-03-19 PROCEDURE — 80053 COMPREHEN METABOLIC PANEL: CPT | Performed by: PHYSICIAN ASSISTANT

## 2025-03-19 PROCEDURE — 87591 N.GONORRHOEAE DNA AMP PROB: CPT | Performed by: PHYSICIAN ASSISTANT

## 2025-03-19 PROCEDURE — 81001 URINALYSIS AUTO W/SCOPE: CPT | Performed by: PHYSICIAN ASSISTANT

## 2025-03-19 PROCEDURE — 76830 TRANSVAGINAL US NON-OB: CPT

## 2025-03-19 PROCEDURE — 87491 CHLMYD TRACH DNA AMP PROBE: CPT | Performed by: PHYSICIAN ASSISTANT

## 2025-03-19 PROCEDURE — 99284 EMERGENCY DEPT VISIT MOD MDM: CPT

## 2025-03-19 PROCEDURE — 83036 HEMOGLOBIN GLYCOSYLATED A1C: CPT | Performed by: PHYSICIAN ASSISTANT

## 2025-03-19 PROCEDURE — 36415 COLL VENOUS BLD VENIPUNCTURE: CPT | Performed by: PHYSICIAN ASSISTANT

## 2025-03-19 PROCEDURE — 81025 URINE PREGNANCY TEST: CPT | Performed by: PHYSICIAN ASSISTANT

## 2025-03-19 RX ORDER — FLUCONAZOLE 150 MG/1
150 TABLET ORAL ONCE
Status: COMPLETED | OUTPATIENT
Start: 2025-03-19 | End: 2025-03-19

## 2025-03-19 RX ADMIN — FLUCONAZOLE 150 MG: 150 TABLET ORAL at 20:48

## 2025-03-19 NOTE — ED PROVIDER NOTES
Time reflects when diagnosis was documented in both MDM as applicable and the Disposition within this note       Time User Action Codes Description Comment    3/19/2025  8:49 PM Amarilis Jenkins [N89.8] Vaginal discharge     3/19/2025  8:50 PM Amarilis Jenkins Add [N76.0] Vulvovaginitis     3/19/2025  9:06 PM Amarilis Jenkins Add [R73.9] Hyperglycemia     3/19/2025  9:09 PM Amarilis Jenkins Add [R74.8] Elevated liver enzymes           ED Disposition       ED Disposition   Discharge    Condition   Stable    Date/Time   Wed Mar 19, 2025  8:50 PM    Comment   Ami Alexander discharge to home/self care.                   Assessment & Plan       Medical Decision Making  Patient is a 24-year-old female with a PMHx of ovarian cysts and prediabetes, presenting to the ED for evaluation of vaginal discharge x1 week.     Differential diagnosis including but not limited to: candida vulvovaginitis, bacterial vaginosis, STD, UTI, PID, TOA.     Labs notable for an elevated blood glucose but normal anion gap. Patient has a history of prediabetes with a Hgb A1c of 5.7 in 2022 but is not on any medications. She was informed of elevated blood glucose level which is consistent with diabetes.  Hemoglobin A1c pending at time of discharge.  Patient was advised to start taking the metformin which was sent to her pharmacy and we discussed the side effects of this medication.  She was advised to schedule appointment with her PCP as soon as possible for close follow-up and continued management of diabetes.  Patient was also found to have mildly elevated liver enzymes, similar to prior labs.  Patient states that she was aware of this and was told that it was secondary to her fatty liver.  She was advised to follow-up with her PCP for close monitoring of her liver enzymes.  She has no upper abdominal pain, jaundice, vomiting, etc at this time but was advised to return to the ED if she does develop any of these symptoms or any other  new/concerning symptoms.  Patient's UA is not consistent with infection. Urine pregnancy test negative. Molecular vaginal panel and STD testing in process. We discussed STD prophylaxis but patient has no concerns for STDs so we will wait for results of testing prior to treating. She has no fevers or cervical motion/adnexal tenderness, low suspicion for PID. Patient also reported lower abdominal/pelvic pain and has mild lower abdominal/pelvic tenderness on exam - U/S obtained for further evaluation and to rule out ovarian torsion or other etiologies of pain. Pelvic U/S unremarkable. Patient's symptoms/exam most consistent with a Candida vulvovaginitis and she was given a dose of Diflucan in the ED. She was advised that we will contact her with any positive test results and adjust treatment as indicated.  She was advised to schedule an appointment with her OB/GYN for close follow-up or return to the ED for any new/worsening symptoms.    The management plan was discussed in detail with the patient at bedside and all questions were answered. Strict ED return instructions were discussed at bedside. Prior to discharge, both verbal and written instructions were provided. We discussed the signs and symptoms that should prompt the patient to return to the ED. All questions were answered and the patient was comfortable with the plan of care and discharged home. The patient agrees to return to the Emergency Department for concerns and/or progression of illness.     Amount and/or Complexity of Data Reviewed  Labs: ordered. Decision-making details documented in ED Course.  Radiology: ordered.    Risk  Prescription drug management.        ED Course as of 03/20/25 0811   Wed Mar 19, 2025   2103 Potassium(!): 5.5  Moderately hemolyzed.   2103 Sodium(!): 132  Normal (135) when corrected for hyperglycemia.    2104 ALT(!): 137  Mildly elevated, similar to prior.  History of hepatomegaly/hepatic steatosis.   2105 AST(!): 92  Mildly  elevated, similar to prior.       Medications   fluconazole (DIFLUCAN) tablet 150 mg (150 mg Oral Given 3/19/25 2048)       ED Risk Strat Scores                                                History of Present Illness       Chief Complaint   Patient presents with    Vaginal Discharge     Vaginal pain and brown discharge       Past Medical History:   Diagnosis Date    Fatty liver     Fatty liver       Past Surgical History:   Procedure Laterality Date    GASTRIC RESTRICTION SURGERY      gastric sleeve      Family History   Problem Relation Age of Onset    Thyroid cancer Mother     Diabetes Mother     Ovarian cancer Paternal Aunt     Scoliosis Sister     Other Sister         born with one kidney    Club foot Sister     Heart murmur Sister     Colon cancer Maternal Grandmother     Heart disease Maternal Grandmother     Hypertension Maternal Grandmother     Diabetes Maternal Grandmother     Other Maternal Grandfather         liver failure      Social History     Tobacco Use    Smoking status: Former    Smokeless tobacco: Never   Vaping Use    Vaping status: Every Day    Substances: Nicotine, THC, Flavoring   Substance Use Topics    Alcohol use: Yes     Comment: socially    Drug use: Yes     Types: Marijuana      E-Cigarette/Vaping    E-Cigarette Use Current Every Day User       E-Cigarette/Vaping Substances    Nicotine Yes     THC Yes     CBD No     Flavoring Yes     Other No       I have reviewed and agree with the history as documented.     Patient is a 24-year-old female with a PMHx of ovarian cysts and prediabetes, presenting to the ED for evaluation of vaginal discharge x1 week.  Patient states that she has had thick, white vaginal discharge and vaginal pruritus for the past week, similar to prior yeast infections.  She has been taking over-the-counter Monistat suppositories every day for the past week with no improvement of symptoms.  Patient states that she has also been experiencing lower abdominal/pelvic pain  bilaterally and had light vaginal bleeding/spotting on Monday that lasted for 1-2 days and then resolved. She states that she has irregular periods so this is not unusual for her.  She states that she had sexual intercourse with her significant other last night and says that this caused worsening vaginal pain/irritation. She also reports having some brown-colored vaginal discharge after sex. She has also been experiencing dysuria but denies any hematuria, frequency, urgency, flank pain, fevers or chills. She is sexually active with 1 male partner and denies any concern for STDs.        Review of Systems   Constitutional:  Negative for chills and fever.   HENT:  Negative for congestion, rhinorrhea and sore throat.    Eyes:  Negative for visual disturbance.   Respiratory:  Negative for cough and shortness of breath.    Cardiovascular:  Negative for chest pain, palpitations and leg swelling.   Gastrointestinal:  Negative for constipation, diarrhea, nausea and vomiting.   Genitourinary:  Positive for dysuria, pelvic pain, vaginal bleeding, vaginal discharge and vaginal pain. Negative for flank pain, frequency and hematuria.   Musculoskeletal:  Negative for back pain and neck pain.   Skin:  Negative for rash.   Neurological:  Negative for dizziness, syncope, weakness and headaches.   All other systems reviewed and are negative.          Objective       ED Triage Vitals [03/19/25 1701]   Temperature Pulse Blood Pressure Respirations SpO2 Patient Position - Orthostatic VS   98.3 °F (36.8 °C) 83 149/83 16 98 % Sitting      Temp Source Heart Rate Source BP Location FiO2 (%) Pain Score    Oral -- Right arm -- --      Vitals      Date and Time Temp Pulse SpO2 Resp BP Pain Score FACES Pain Rating User   03/19/25 1701 98.3 °F (36.8 °C) 83 98 % 16 149/83 -- -- RLN            Physical Exam  Vitals and nursing note reviewed. Exam conducted with a chaperone present.   Constitutional:       General: She is awake.      Appearance:  Normal appearance. She is well-developed. She is not toxic-appearing or diaphoretic.   HENT:      Head: Normocephalic and atraumatic.      Right Ear: External ear normal.      Left Ear: External ear normal.      Nose: Nose normal.      Mouth/Throat:      Lips: Pink.      Mouth: Mucous membranes are moist.   Eyes:      General: Lids are normal. No scleral icterus.     Conjunctiva/sclera: Conjunctivae normal.      Pupils: Pupils are equal, round, and reactive to light.   Cardiovascular:      Rate and Rhythm: Normal rate and regular rhythm.      Pulses: Normal pulses.           Radial pulses are 2+ on the right side and 2+ on the left side.      Heart sounds: Normal heart sounds, S1 normal and S2 normal.   Pulmonary:      Effort: Pulmonary effort is normal. No accessory muscle usage.      Breath sounds: Normal breath sounds. No stridor. No decreased breath sounds, wheezing, rhonchi or rales.   Abdominal:      General: Abdomen is flat. Bowel sounds are normal. There is no distension.      Palpations: Abdomen is soft.      Tenderness: There is abdominal tenderness in the suprapubic area. There is no right CVA tenderness, left CVA tenderness, guarding or rebound.      Comments: Mild tenderness across the lower pelvic region bilaterally, primarily suprapubic.  No rebound tenderness, guarding or rigidity.   Genitourinary:     Exam position: Supine.      Comments: Mild vulvar edema/erythema. No genital lesions. Small amount of thick, blood tinged discharge in vaginal vault. No active bleeding or clots. No evidence of abscess. No CMT/adnexal tenderness.   Musculoskeletal:      Cervical back: Full passive range of motion without pain and neck supple. No signs of trauma. No pain with movement.      Right lower leg: No edema.      Left lower leg: No edema.   Lymphadenopathy:      Cervical: No cervical adenopathy.   Skin:     General: Skin is warm and dry.      Capillary Refill: Capillary refill takes less than 2 seconds.       Coloration: Skin is not cyanotic, jaundiced or pale.   Neurological:      Mental Status: She is alert and oriented to person, place, and time.      GCS: GCS eye subscore is 4. GCS verbal subscore is 5. GCS motor subscore is 6.      Gait: Gait normal.   Psychiatric:         Mood and Affect: Mood normal.         Speech: Speech normal.         Behavior: Behavior is cooperative.         Results Reviewed       Procedure Component Value Units Date/Time    Hemoglobin A1C [[id number]]  (Abnormal) Collected: 03/19/25 2037    Lab Status: Final result Specimen: Blood from Arm, Right Updated: 03/19/25 2332     Hemoglobin A1C 9.9 %       mg/dl     Comprehensive metabolic panel [[id number]]  (Abnormal) Collected: 03/19/25 2037    Lab Status: Final result Specimen: Blood from Arm, Right Updated: 03/19/25 2102     Sodium 132 mmol/L      Potassium 5.5 mmol/L      Chloride 100 mmol/L      CO2 23 mmol/L      ANION GAP 9 mmol/L      BUN 11 mg/dL      Creatinine 0.58 mg/dL      Glucose 305 mg/dL      Calcium 9.3 mg/dL      AST 92 U/L       U/L      Alkaline Phosphatase 90 U/L      Total Protein 7.9 g/dL      Albumin 4.1 g/dL      Total Bilirubin 0.50 mg/dL      eGFR 131 ml/min/1.73sq m     Narrative:      [employer] guidelines for Chronic Kidney Disease (CKD):     Stage 1 with normal or high GFR (GFR > 90 mL/min/1.73 square meters)    Stage 2 Mild CKD (GFR = 60-89 mL/min/1.73 square meters)    Stage 3A Moderate CKD (GFR = 45-59 mL/min/1.73 square meters)    Stage 3B Moderate CKD (GFR = 30-44 mL/min/1.73 square meters)    Stage 4 Severe CKD (GFR = 15-29 mL/min/1.73 square meters)    Stage 5 End Stage CKD (GFR <15 mL/min/1.73 square meters)  Note: GFR calculation is accurate only with a steady state creatinine    CBC and differential [[id number]]  (Abnormal) Collected: 03/19/25 2037    Lab Status: Final result Specimen: Blood from Arm, Right Updated: 03/19/25 2044     WBC 11.80 Thousand/uL      RBC 5.07  Million/uL      Hemoglobin 14.4 g/dL      Hematocrit 44.0 %      MCV 87 fL      MCH 28.4 pg      MCHC 32.7 g/dL      RDW 12.7 %      MPV 10.3 fL      Platelets 247 Thousands/uL      nRBC 0 /100 WBCs      Segmented % 67 %      Immature Grans % 0 %      Lymphocytes % 25 %      Monocytes % 6 %      Eosinophils Relative 1 %      Basophils Relative 1 %      Absolute Neutrophils 7.86 Thousands/µL      Absolute Immature Grans 0.03 Thousand/uL      Absolute Lymphocytes 2.94 Thousands/µL      Absolute Monocytes 0.75 Thousand/µL      Eosinophils Absolute 0.15 Thousand/µL      Basophils Absolute 0.07 Thousands/µL     Urine Microscopic [[id number]]  (Normal) Collected: 03/19/25 1945    Lab Status: Final result Specimen: Urine, Clean Catch Updated: 03/19/25 2008     RBC, UA 1-2 /hpf      WBC, UA 1-2 /hpf      Epithelial Cells Occasional /hpf      Bacteria, UA None Seen /hpf     UA w Reflex to Microscopic w Reflex to Culture [[id number]]  (Abnormal) Collected: 03/19/25 1945    Lab Status: Final result Specimen: Urine, Clean Catch Updated: 03/19/25 2007     Color, UA Light Yellow     Clarity, UA Clear     Specific Gravity, UA 1.030     pH, UA 5.5     Leukocytes, UA Elevated glucose may cause decreased leukocyte values. See urine microscopic for UWBC result     Nitrite, UA Negative     Protein, UA Trace mg/dl      Glucose, UA >=1000 (1%) mg/dl      Ketones, UA Negative mg/dl      Urobilinogen, UA <2.0 mg/dl      Bilirubin, UA Negative     Occult Blood, UA Small    Chlamydia/GC amplified DNA by PCR [[id number]] Collected: 03/19/25 1945    Lab Status: In process Specimen: Urine, Other Updated: 03/19/25 1953    Molecular Vaginal Panel [[id number]] Collected: 03/19/25 1949    Lab Status: In process Specimen: Genital from Vaginal Updated: 03/19/25 1952    POCT pregnancy, urine [[id number]]  (Normal) Collected: 03/19/25 1950    Lab Status: Final result Updated: 03/19/25 1950     EXT Preg Test, Ur Negative     Control Valid            US  pelvis complete w transvaginal   Final Interpretation by Javier Dawkins MD (03/19 2045)      No identifiable acute abnormality to account for the patient's clinical presentation.                           Workstation performed: [id number]             Procedures    ED Medication and Procedure Management   Prior to Admission Medications   Prescriptions Last Dose Informant Patient Reported? Taking?   Junel FE 1/20 1-20 MG-MCG per tablet   No No   Sig: TAKE 1 TABLET BY MOUTH EVERY DAY   chlorhexidine (PERIDEX) 0.12 % solution   No No   Sig: Apply 15 mL to the mouth or throat 2 (two) times a day   cholecalciferol (VITAMIN D3) 400 units tablet   Yes No   Sig: Take 400 Units by mouth daily   ciprofloxacin-dexamethasone (CIPRODEX) otic suspension   No No   Sig: Administer 4 drops into both ears 2 (two) times a day   cyclobenzaprine (FLEXERIL) 10 mg tablet   No No   Sig: Take 1 tablet (10 mg total) by mouth 2 (two) times a day as needed for muscle spasms      Facility-Administered Medications: None     Discharge Medication List as of 3/19/2025  9:13 PM        START taking these medications    Details   metFORMIN (GLUCOPHAGE) 500 mg tablet Take 1 tablet (500 mg total) by mouth 2 (two) times a day with meals, Starting Wed 3/19/2025, Normal           CONTINUE these medications which have NOT CHANGED    Details   chlorhexidine (PERIDEX) 0.12 % solution Apply 15 mL to the mouth or throat 2 (two) times a day, Starting Tue 1/23/2024, Normal      cholecalciferol (VITAMIN D3) 400 units tablet Take 400 Units by mouth daily, Historical Med      ciprofloxacin-dexamethasone (CIPRODEX) otic suspension Administer 4 drops into both ears 2 (two) times a day, Starting Fri 12/8/2023, Normal      cyclobenzaprine (FLEXERIL) 10 mg tablet Take 1 tablet (10 mg total) by mouth 2 (two) times a day as needed for muscle spasms, Starting Thu 7/28/2022, Normal      Junel FE 1/20 1-20 MG-MCG per tablet TAKE 1 TABLET BY MOUTH EVERY DAY, Normal            No discharge procedures on file.  ED SEPSIS DOCUMENTATION   Time reflects when diagnosis was documented in both MDM as applicable and the Disposition within this note       Time User Action Codes Description Comment    3/19/2025  8:49 PM Amarilis Jenkins [N89.8] Vaginal discharge     3/19/2025  8:50 PM Amarilis Jenkins [N76.0] Vulvovaginitis     3/19/2025  9:06 PM Amarilis Jenkins [R73.9] Hyperglycemia     3/19/2025  9:09 PM Amarilis Jenkins [R74.8] Elevated liver enzymes                  Amarilis Jenkins PA-C  03/20/25 0811

## 2025-03-20 ENCOUNTER — RESULTS FOLLOW-UP (OUTPATIENT)
Dept: EMERGENCY DEPT | Facility: HOSPITAL | Age: 25
End: 2025-03-20

## 2025-03-20 LAB
C GLABRATA DNA VAG QL NAA+PROBE: POSITIVE
C KRUSEI DNA VAG QL NAA+PROBE: NEGATIVE
C TRACH DNA SPEC QL NAA+PROBE: POSITIVE
CANDIDA SP 6 PNL VAG NAA+PROBE: POSITIVE
N GONORRHOEA DNA SPEC QL NAA+PROBE: NEGATIVE
T VAGINALIS DNA VAG QL NAA+PROBE: NEGATIVE
VAGINOSIS/ITIS DNA PNL VAG PROBE+SIG AMP: POSITIVE

## 2025-03-20 RX ORDER — METRONIDAZOLE 500 MG/1
500 TABLET ORAL EVERY 8 HOURS SCHEDULED
Qty: 15 TABLET | Refills: 0 | Status: SHIPPED | OUTPATIENT
Start: 2025-03-20 | End: 2025-03-25

## 2025-03-20 RX ORDER — FLUCONAZOLE 150 MG/1
TABLET ORAL
Qty: 3 TABLET | Refills: 0 | Status: SHIPPED | OUTPATIENT
Start: 2025-03-20 | End: 2025-03-27

## 2025-03-20 NOTE — DISCHARGE INSTRUCTIONS
-Your blood glucose level was elevated today and is consistent with diabetes. Please start taking the prescribed medication (Metformin) and schedule an appointment with your primary care doctor as soon as possible for close follow-up and continued management of this.    -Your blood work also showed elevated liver enzymes. Please contact your PCP for follow-up and close monitoring of these. Return to the ED if you develop any upper abdominal pain, vomiting, fevers, yellowing of the skin or any other new/concerning symptoms.